# Patient Record
Sex: MALE | Race: WHITE
[De-identification: names, ages, dates, MRNs, and addresses within clinical notes are randomized per-mention and may not be internally consistent; named-entity substitution may affect disease eponyms.]

---

## 2020-01-17 NOTE — NUR
Patient discharged to home in stable conditon.  Written and verbal after care 
instructions given. 

Patient verbalizes understanding of instructions.pt walks in steady gait. pt 
says feels better.

## 2020-04-09 NOTE — NUR
Received patient awake and alert in bed, A/Ox3. No acute distress noted. No complaints of 
pain, no complaints of SOB on 2L NC saturating at 98%. Patient has history of COPD, titrated 
to 1.5L, saturating at 96-97%. Patient is Sinus tachy on the monitor. 100-110. Goes in the 
120s when coughing, noted with productive coughing. Patient is on droplet and contact 
precautions for r/o COVID. Vitals WNL. No fevers noted. Heplock on the left AC is intact and 
patent. Safety measures initiated. Bed is low and locked, call light within reach. Will 
continue to monitor.

## 2020-04-09 NOTE — NUR
Received patient from ER via [facility] with Diagnosis of COPD Exacerbation under Dr. Torres, 
Patient is awake , alert and verbally responsive. On Oxygen at 2LPM, saturating 95-96%. No 
complain of pain at this time. patient on Contact and droplet isolation for r/o covid 19. 
Proper PPE strictly Observed. Kept clean and comfortable. Will continue to monitor.

## 2020-04-09 NOTE — NUR
PT ROOM ON NEG PRESSURE

AIRBORNE/CONTACT/DROPLET ISOLATION PREC

MINIMIZED PT ROOM CHECK

 

PT MONITORED ACCORDINGLY

## 2020-04-09 NOTE — NUR
RT AT BEDSIDE FOR ABG DRAW

RT LOWERED O2 FROM 5LPM TO 2LPM



PT KEPT CALM AND COMFORTABLE.

ABLE TO TOLERATE INHALER

PT 92% O2SAT

## 2020-04-09 NOTE — NUR
Patient C/O SOB. 

A/O x3. Able to verbalize needs, able to speak complete sentenses

O2 saturation 91% RA. Placed on 5L O2 via nasal cannula with SpO2 97%. Pt 
position to comfort, high fowlers position. SRx2 for safety.

Able to walk per self without difficulty.



Monitored accordingly

## 2020-04-09 NOTE — NUR
Pt. admitted to Tele Rm 318   

Transported with isolation precations via gurney

acc by 3 staff (2ERRN, 1ERLVN)

Belongs List completed

## 2020-04-10 NOTE — NUR
Patient slept intermittently throughout the night. SR-ST on the monitor. Patient states, he 
is feeling better. Still with occasional productive coughing. Medication given as ordered. 
Patient refused morning labs, educated patient, but still refused and stated "no they poked 
me twice yesterday, they can do it tomorrow, I can't given blood today" phlebotomist stated 
he will try again after breakfast. Will endorse to next shift.

## 2020-04-10 NOTE — NUR
Received patient in bed, awake, alert and verbally responsive. On Oxygen at 2LPM, saturating 
96%. No complain of Pain or discomfort. Afebrile. Remains on droplet and contact Isolation 
for r/o covid 19. Proper PPE strictly Observed. Kept clean and comfortable. Will continue to 
monitor.

## 2020-04-10 NOTE — NUR
Patient in bed, awake, alert and verbally responsive. No signs of distress noted. No SOB. No 
desaturation noted. Afebrile. No complain of Pain or discomfort. remains on contact/droplet 
precaution for r/o covid 19, still awaiting for result. All needs attended and met. Kept 
clean and comfortable. Will endorse to Oncoming Nurse.

## 2020-04-10 NOTE — NUR
patient received lying in bed watching tv. no s/s of acute distress. v/s stable. on RA 98%. 
all needs met and medications administered, tolerated well. patient requested temazepam, 
administered, tolerated well. will continue plan of care.

## 2020-04-11 NOTE — NUR
Received patient awake and alert in bed, no signs of acute distress noted. No complaints of 
pain or SOB. Patient stated that he is feeling better. Still noted with productive coughing. 
Vitals WNL. No fevers noted. On contract/droplet isolation, waiting for COVID19 results. 
Heplock on the left AC is intact and patent. Safety measures initiated. Bed is low and 
locked, call light within reach. Will continue to monitor.

## 2020-04-11 NOTE — NUR
pt. resting comfortably in bed. Pt. denies sob/ difficulty breathing. pt. denies pain/ 
discomfort. IV intact L AC 20 gauge intact patent. pt. on room air oxygen saturation within 
normal limits. no temperature throughout shift. safety measures in place. pt. on droplet/ 
contact precautions. call light within reach. will continue to monitor pt.

## 2020-04-11 NOTE — NUR
patient slept intermittently throughout the night. no s/s of acute distress. v/s stable. 
afebrile. denies SOB or pain. will continue plan of care.

## 2020-04-11 NOTE — NUR
Received pt. resting in bed awake, alert and verbally responsive. No signs of distress 
noted. No SOB. No desaturation noted. Afebrile. Vitals stable. No complaints of Pain or 
discomfort. remains on contact/droplet precaution for r/o covid 19, still awaiting for 
result. All needs met. Kept clean and comfortable. safety measures in place. call light 
within reach. will continue to monitor pt.

## 2020-04-11 NOTE — NUR
patient awoke from sleep. provided sandwich and drinks. resting comfortably. spo2 at 90% on 
NC 1L at this time on monitor. will continue to monitor.

## 2020-04-12 NOTE — NUR
RECEIVED PATIENT AWAKE ALERT AND ORIENTED REMAIN ON RESP ISOLATION AND PRECAUTION AS ORDERED 
PENDING RESULTS OF THE COVID 19 CULTURES AS ORDERED ON ROOM AIR WITH NO SHORTNESS OF BREATH 
NOTED OCCASSIONAL COUGH EPISODES SPITTING UP SMALL AMOUNT OF WHITISH PHLEGM ABLE TO 
VERBALISE NEEDS CALL LIGHTS AND PERSONAL BELONGINGS ARE WITHIN EASY REACH AT THIS TIME WILL 
CONTINUE TO OBSERVE.

## 2020-04-12 NOTE — NUR
Received patient awake and alert in bed, no signs of acute distress noted. No complaints or 
pain or SOB, stated feeling good. Heplock on the left forearm is intact and patent. Patient 
no longer on droplet/contact isolation. Result for COVID19 is negative. Vitals WNL. Safety 
measures initiated. Bed is low and locked, call light within reach. Will continue to 
monitor.

## 2020-04-12 NOTE — NUR
RECEIVED RESULT FROM THE LAB PATIENT PATIENT IS NEGATIVE FOR COVID-19 CALLED THE SUPERVISOR 
NOTIFIED HER AND ALSO NOTIFIED ENRIQUE NP ASSISIGNED TO THIS PATIENT PATIENT REMOVED FROM 
ISOLATION AT THIS TIME OCCASSIONAL COUGH WITH SCANTY AMOUNT OF CLEAR THIN SECRETIONS REMAIN 
ON SOLU MEDROL AS ORDERED WILL CONTINUE TO OBSERVE

## 2020-04-12 NOTE — NUR
RESTING IN HIS ROOM WITH IV ATB IN PROGRESS AS ORDERED WITH NO ADVERSE OR ALLERGIC REACTIONS 
AT THIS TIME NO SOB NOTED TO BE USING O2 ON AND OFF WITH ADEQUATE SATS USES O2 AT TIMES 
STATED O2 MAKES HIM FEEL BETTER MADE COMFORTABLE WILL CONTINUE TO OBSERVE.

## 2020-04-12 NOTE — NUR
Patient slept intermittently throughout the night, no distress noted. Vitals WNL. SR/ST on 
the monitor. Medications given as ordered. All needs met. Remains on droplet/contact 
precautions. Called lab for results, still pending. Will endorse to morning shift nurse.

## 2020-04-13 NOTE — NUR
PATIENT DISCHARGED. PICKED UP BY HIS MOTHER'S CAREGIVER, LAURO. IN SATISFACTORY CONDITION, 
WITH DISCHARGE INSTRUCTION AND ALL HIS PERSONAL BELONGINGS. PATIENT INSTRUCTED TO  
HIS MEDICATION FROM HIS LOCAL PHARMACY, TAKE MEDICATION AS ORDERED AND MAKE A FOLLOW UP 
APPOINTMENT WITH HIS PHYSICIAN IN ONE WEEK, PATIENT EXPRESSED UNDERSTANDING. DENIES SOB.

## 2020-04-13 NOTE — NUR
PATIENT HAS A DISCHARGE ORDER PER THE NP STATED TO GIVE HIS LEVAQUIN THAT HE IS SUPPOSED TO 
GET AT 1800 TODAY BY 1400 AS THE PATIENT IS PLANNING TO BE PICKED UP BY 1500 PHARMACY AWARE 
TO SEND THE DOSE EARLY.

## 2020-04-13 NOTE — NUR
PATIENT RECEIVED IN BED, AWAKE, ALERT, ORIENTED, HYPER VOCAL. USES O2 ON AND OFF BUT NO 
SIGNS OF RESPIRATORY DISTRESS. STATED THAT HE COUGHS OCCASIONALLY, ALSO STATED THAT HE SLEPT 
WELL LAST NIGHT. CALL LIGHTS AND ALL HIS PERSONAL BELONGINGS ARE WITHIN EASY REACH. PLAN FOR 
THE DAY IS DOCTOR TO SEE HIM AND DISCUSS POSSIBLE DISCHARGE AND HE EXPRESSED UNDERSTANDING. 
WILL CONTINUE TO OBSERVE.
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
## 2021-03-19 NOTE — NUR
Report given to Jacqueline ARSHAD. Patient will be transferred to Room 330. All 
belongings with patient. Old record with patient.

## 2021-03-20 NOTE — NUR
Pt slept intermittently throughout the night. Admitted to MS at 2105H. Assessment complete 
and all belongings accounted for. Tolerated all medications well. Wound care done of left 
foot, pictures in the chart. Wound care consult is ordered. Bed is locked and in lowest 
position, call light is within reach. No other issues or concerns at this time. Will endorse 
to day shift.

## 2021-03-20 NOTE — NUR
pt awake alert and orientedx4, all medications given as ordered, pt on room air, no reports 
of pain, no signs of distress noted. all needs met this shift. pt has healthy appetite, 
cooperative with plan of care. Pt has BRP, ambulates to restroom, pt has walker in room at 
bedside. On regular diet, dressing changed, culture taken pending results, applied xerofoam, 
kerlix and coband. dressing is clean dry and intact. IV access on the right FA 18g IVf 
infusing  NS @75cc, patent and intact.  bed in low and locked position, call light within 
reach, all medications given as ordered, will endorse to oncoming nurse.

## 2021-03-20 NOTE — NUR
Received patient AAOx4. No s/s of acute distress noted at this time. Pt on RA denies SOB. 
Left leg dressing clean, dry, and intact. Pt c/o discomfort and Right FA IV site, will 
insert a new IV. Pt refused Lovenox. Provided patient with medication education and reviewed 
the benefits and risk. Patient continued to refused. Safety measures in place, bed locked in 
low position, and call light within reach.

## 2021-03-21 NOTE — NUR
Patient is alert and oriented. Denies sob. Due meds given and tolerated. On IV Vancomycin 
and Merrem tolerated. No adverse reaction noted. LFA IV intact and patent. Patient is 
comfortable. Bed kept low and locked at all times. Call light within reach. Needs attended. 
Will continue to monitor.

## 2021-03-21 NOTE — NUR
Received patient in bed sleeping but easily arousable. No resp distress noted. No facial 
grimacing. Left lower leg dressing intact. No bleeding noted. Bed kept low and locked. Call 
button within reach. Will continue to monitor.

## 2021-03-21 NOTE — NUR
Patient with scheduled vanco trough this morning at 0700. However, patient refused saying he 
wants it after breakfast. Called pharmacy and spoke to Christiano said to hold Vancomycin 8am 
dose for now until result. Spoke to Darinel at lab and confirmed he will remind phlebotomist 
to come back. Patient aware and agreed.

## 2021-03-22 NOTE — NUR
Received patient AAOx4. No s/s of acute distress noted at this time. VSS. Left leg dressing 
clean, dry, and intact. Pt c/o discomfort in leg 7/10 pain administered NORCO PRN and Left 
FA IV site 20G, patent and intact infusing NS 75cc/hr and IV antibiotic. Pt refused Lovenox. 
Provided patient with medication education and reviewed the benefits and risk. Patient 
verbalized understanding and refuses. Snack provided. Aware of NPO after midnight. Safety 
measures in place, bed locked in low position, and call light within reach. Will monitor 
through the night.

## 2021-03-22 NOTE — NUR
Pt is in bed resting, A&O4 on room air saturating at 98%. New IV inserted on LFA is flushing 
well and patent, received Vanco and Merrem as ordered. Pt instructed to be NPO at midnight 
for procedure tomorrow at noon. Dressing changed during shift. All Meds given as ordered. 
All needs met. Safety measures in place. Will endorse to oncoming nurse.

## 2021-03-22 NOTE — NUR
Pt is in bed resting, A&O4 on room air saturating at 99%. LFA IV is patent and flushing 
well. No acute distress noted. Pt is able to make needs known. Bed in lowest position. 
Safety measures in place. Call light in reach. Will continue to monitor.

## 2021-03-23 NOTE — NUR
RECEIVED PATIENT ALERT AND ORIENTED x 4, SLEEPING INTERMITTENTLY THIS MORNING. VSS. AWARE OF 
NPO STATUS FOR SURGICAL WOUND DEBRIDEMENT TODAY. METFORMIN HELD D/T MORNING LAB GLUCOSE 100 
AND NPO STATUS UNTIL POST-SURGERY.VERBALIZED UNDERSTANDING. IV ON LEFT FOREARM 20G - FLUSHED 
AND PATENT. SAFETY PRECAUTION IN PLACE. WILL CONTINUE TO MONITOR.

## 2021-03-23 NOTE — NUR
FOOD DELIVERY FROM [facility] RECEIVED AND DELIVERED BY SECURITY UP TO THIRD FLOOR NURSING 
STATION. INTERCEPTED D/T PATIENT'S NPO STATUS PRIOR TO SCHEDULED SURGERY.  AT 
NURSING STATION INQUIRING ABOUT PATIENT STATUS TO CONFIRM HOMELESS STATUS. INFORMED SOCIAL 
WORKER OF THAT PATIENT IS ASLEEP AND SLUMP OVER HIS BED SO IT WILL BE DIFFICULT TO INTERVIEW 
HIM AT THIS TIME, RAISING SUSPICION D/T PATIENT'S POLYSUBSTANCE ABUSE. PATIENT RECEIVED SAME 
[facility] DELIVERY YESTERDAY. SEARCHED [employer] BAG AND FOUND THREE WHITE, OVAL PILLS, AND 
A TARRY BLACK SUBSTANCE WRAPPED IN FOIL. REMOVED FROM THE DANG[employer]S BAG AND GIVEN TO 
SECURITY. [employer] CALLED BY [employer] REGARDING THE SUBSTANCES FOUND. MD NOTIFIED AND AWARE OF 
SITUATION. WILL CONTINUE TO MONITOR.

## 2021-03-23 NOTE — NUR
PICKED UP BY OR STAFF. VSS. NO S/S OF DISTRESS NOTED. JEWELRY REMOVED AND PLACED AT BEDSIDE, 
PER PATIENT REQUEST. Home

## 2021-03-23 NOTE — NUR
attempted to meet with the patient to verify living situation, but was 
informed by patient's nurse Cr that patient was lethargic at this time.  SW attempted to 
gather information from nursing regarding patient's living situation and psychosocial needs, 
and was informed that patient's living situation was unclear.  Nursing also informed this SW 
that patient has a history of polysubstance use, and that patient's friends were delivering 
Mi's to patient, both yesterday and today.  Due to patient being NPO in preparation 
for surgery today, the Mi's meal that was delivered today was being kept for the 
patient to have at a later time.  Nursing also described fluctuations in patient's level of 
consciousness and patient's mood over the last couple of days.  Per protocol, the Mi's 
bag that was delivered today was searched, and unprescribed narcotics were found folded up 
in tissue and foil and placed in-between the bread and the meat olga.   
Heri and  Adonis Sandhu were informed, along with Nursing Director Edna.   


SW will remain available to follow-up with the patient at a later time.

## 2021-03-23 NOTE — NUR
Patient refused lovenox inj. taught patient the risk and benefit of the medication but 
strongly refused.

## 2021-03-24 NOTE — NUR
WOUND DRESSING CHANGED BY DR. THOMSON AT BEDSIDE. PHOTO TAKEN OF THE WOUND AND PLACED IN 
CHART. DENIES SOB. COMPLAINTS OF PAIN POST DRESSING CHANGE. ELEVATED LEFT LEG AND ASSIST IN 
REPOSITIONING PATIENT. WILL F/U ON PAIN MEDICATION AVAILABLE. WILL CONTINUE TO MONITOR.

## 2021-03-24 NOTE — NUR
PATIENT ALERT ORIENTED, NO SOB NO CHEST PAIN. PATIENT CONT PAIN MANAGEMENT, PATIENT LEFT LEG 
DRESSING INTACT, NO BLEEDING NOTED, CONT TO MONITOR.

## 2021-03-25 NOTE — NUR
Received patient in bed AAOx3-4. No s/s of acute distress noted at this time. Pt on RA 
denies SOB at this time. Left lower leg dressing clean, dry, and intact. Pt c/o episode of 
diarrhea, Notified Jaqui COULTER, new order for one time imodium, will administer per order. 
BALDEV midline patent and intact. Safety measures in place, call light in reach, bed locked and 
in low position.

## 2021-03-25 NOTE — NUR
received change of shift report. pt in bed resting, no complaints of pain at this time, on 
room air, no signs of shortness of breath. Left lower leg wound covered with dressing, 
clean, dry and intact. A/O x4, amb, steady gait, pt has BRP, pt has urinal at bedside, IV 
access on the right UA midline 20g running NS at 75cc. bed in low and locked position, call 
light within reach, safety precautions in place.

## 2021-03-25 NOTE — NUR
pt discharge home with all belongings, paperwork and prescriptions, ID band removed, IV line 
removed at 1330. Pt was waiting for ride that could not come, and had to reschedule another 
ride. pt was picked up at 1540. pt left via wheelchair to taxi. ambulatory with walker, all 
medications given as ordered, no reports of pain, dressing changed, dry, clean and intact, 
no signs of distress noted.

## 2021-06-22 NOTE — NUR
Patient is resting comfortably on gurney with eyes closed.  Pt is admitted to   
[facility]-surgical room 322, under care of Dr. De Leon.  Belongings List 
completed.  MRSA swab collected.

## 2021-06-22 NOTE — NUR
Received patient in bed alert x4.On RA, denies SOB. no c/o pain at this time.Cellulitis on 
LT lower leg, dressing changed .Tolerated well. Iv on right hand patent and intact.VSS.Call 
light with in reach.will continue to monitor.

## 2021-06-22 NOTE — NUR
received patient from ER in stable condition, patient admitted in stable, no complains of 
any pain or discomfort at this time. call light within reach. will report to oncoming shift.

## 2021-06-23 NOTE — NUR
WOUND CARE CONSULT: PT PRESENTS WITH LARGE PURULENT WOUND TO LEFT FOOT/LOWER LEG AND SACRAL 
STAGE 2 ULCER, PRESENT ON ADMISSION. PT STATES HAS HAD SACRAL ULCER FOR 4 DAYS. 
RECOMMENDATIONS MADE FOR WOUND CARE AND SKIN PROTECTION. DISCUSSED WITH NURSING STAFF. DEFER 
TO DPM (DR THOMSON ) FOR LEFT LOWER LEG WOUND. MD IN AGREEMENT WITH PLAN OF CARE. 

-------------------------------------------------------------------------------

Addendum: 06/23/21 at 1205 by RAYMUNDO MOFFETT RN

-------------------------------------------------------------------------------

Amended: Links added.

## 2021-06-23 NOTE — NUR
Resting but easily arousable. No acute distress. Denies pain. Debridement done by Dr. Roblero tolerated. BS rechecked noted 219. Voiding well. Needs attended.

## 2021-06-23 NOTE — NUR
Seen and examined by Lina for wound care. Patient states he's had sacral pressure ulcer st 2 
for 4 days. Wound care done as ordered. Patient denies pain.

## 2021-06-23 NOTE — NUR
Resting but easily arousable. Breathing non labored on room air comfortable. Left leg 
dressing intact. Able to ambulate to the bathroom by himself. Denies pain at this time. Bed 
is low and locked. Call light in place. Continue to monitor.

## 2021-06-23 NOTE — NUR
RECEIVED PT AWAKE, ALERT AND ORIENTEDX4. PT IN NO ACUTE DISTRESS. IV INTACT. SAFETY AND 
COMFORT PROVIDED. WILL CONTINUE TO MONITOR.

## 2021-06-24 NOTE — NUR
Patient is discharged and was picked up by Jose Luis, his friend, with all his personal 
belongings. He was instructed on picking up his new medications from the pharmacy and to 
follow up with his primary care within a week. All information was given to him and he 
expressed understanding. Discharged in satisfactory condition.

## 2021-06-24 NOTE — NUR
PT SLEPT INTERMITTENTLY. PT IN NO ACUTE DISTRESS. IV INTACT.  PRESCRIBED MEDICATION GIVEN 
AND PT TOLERATED IT WELL.PT  WOKEUP AT 0215H AND ASKED FOR HIS MORPHINE MEDICATION, TOLD HIM 
HE DOESN'T HAVE ANY MORPHINE BUT HAS NORCO. HE STATED HE TOOK IT BEFORE AND MAKE HIM SLEEP. 
REVIEWED HIS CHART AND TOLD HIM HE DIDN'T GET ANY MORPHINE SINCE HE WAS ADMITTED IN ER.  
TOLD HIM ONLY NORCO WAS ORDERED FOR HIM. AT 0232H NORCO 5-325MG PRN GIVEN TO PT FOR LEFT LEG 
AND FOOT PAIN. PT REFUSED TO HAVE HIS BLOOD SUGAR CHECKED IN AM AND LAST NIGHT. HE STATED 
THE PHLEBOTOMIST HAVE HIS BLOOD SO WE JUST NEED TO CHECK HIS RESULT FROM THERE.  SAFETY AND 
COMFORT PROVIDED. ALL NEEDS ARE MET.WILL ENDORSE TO INCOMING NURSE FOR CONTINUITY OF CARE.

## 2021-06-24 NOTE — NUR
Patient states he is experiencing upset stomach. Had a soft brown BM this morning. Contacted 
Dr. De Leon with new orders for 15 mL Mylanta once PRN upset stomach. Will administer as 
ordered and continue to monitor.

## 2021-06-24 NOTE — NUR
Patient aware of pending discharge after lunch once culture results come back. Patient 
states his friend, Jose Luis will be taking him home. Patient states his girlfriend will be able 
to help him at home.

## 2021-06-24 NOTE — NUR
Patient received in bed with eyes closed, but easily arousable. Patient states he has no 
pain or discomforts at this time and is ready to go home. If discharged today, patient 
states his friend Jose Luis is able available for transport. Patient refused to have his blood 
sugar checked with the previous shift, but blood draw shows glucose level of 118 and no 
insulin was given as per protocol. IV on his right hand 20G is patent with no redness or 
swelling. Patient on RA with no SOB or difficulties breathing. Call light and personal 
belongings within easy reach. Will continue to monitor.

## 2021-06-24 NOTE — NUR
Spoke to lab and as per lab, the pending culture will not be ready today. It will take a 
minimum of 48 hours to process. Updated Dr. De Leon and asked for discharge clarification 
orders. As per Dr. De Leon, okay to discharge patient without the culture results. 
Informed patient and patient expressed understanding. Stated that his friend ("brother in 
law"), Jose Luis, could be here at 1900 tonight. Patient also refused blood sugar check stating 
that it doesn't matter since he'll be going home. Patient educated on importance and 
benefits of blood sugar checks, but continues to refuse. Will continue to monitor and 
proceed with discharge.

## 2022-02-03 NOTE — NUR
RECEIVED PATIENT VIA GURNEY FROM ER. PATIENT IS A/O X4. OPEN WOUND NOTED TO LEFT LOWER LEG. 
NO C/O PAIN AT THIS TIME. NO RESP. DISTRESS NOTED. H/L INTACT AND PATENT, NOTED TO LEFT FA 
#20 GAUGE. ORIENTED PATIENT TO ROOM AND CALL LIGHT. CALL LIGHT IN REACH. ALL NEEDS ATTENDED. 
WILL CONTINUE TO MONITOR AND ASSESS.

## 2022-02-04 NOTE — NUR
WOUND CARE CONSULT: PT PRESENTS WITH PURULENT WOUND TO LEFT ANTERIOR FOOT, PRESENT ON 
ADMISSION. DR THOMSON NOTIFIED OF DPM CONSULT REQUEST. PT IS VERY THIN AND BONY BUT IS ABLE 
TO ASSIST WITH TURNING AND REPOSITIONING IN BED. MD IN AGREEMENT WITH PLAN OF CARE.

## 2022-02-04 NOTE — NUR
Patient yelling at staff, restless, refusing any blood draw or blood sugar check. Explained 
risks and benefits of blood sugar check and insulin coverage. Patient still refuses.

## 2022-02-04 NOTE — NUR
Spoke to Pharmacy and confirmed that we may administer Vanco as ordered even trough Van 
Trough was not drawn.

## 2022-02-04 NOTE — NUR
Arrived to patient's room awake, alert, and oriented.  Patient able to make needs known.  IV 
site intact and patent.  Call light within reach with bed left in lowest position.  Patient 
to receive antibiotics at 1200.  Will monitor patient throughout shift.

## 2022-02-04 NOTE — NUR
Patient is sleeping, AAO to person and place. Patient is restless, yelling at staff to turn 
off the light and complaining of being cold. Covered with warm blanket and patient 
satisfied. On RA, no SOB noted. Left FA IV in place, patent and intact. Safety measures 
initiated, call light within reach.

## 2022-02-04 NOTE — NUR
Patient in bed distressed due to withdrawal from heroin use.  Patient received norco per Dr. Goddard orders.  Patient in bed resting.  Wound consult on patient's LLL due to cellulitis.  
New dressing placed this afternoon by Dr. Arzate.  Bed left in lowest position with call 
light within reach.  Will endorse information to PM nurse.

## 2022-02-05 NOTE — NUR
Patient left against medical advice. Patient AOx4. On room air. Restless and yelling at 
staff and stating wanting to go home. Patient refused morning dose of Humulin R. Informed 
and explained to patient risks of leaving AMA and patient verbalized understanding. Patient 
signed AMA form and placed on chart. IV access removed. ID armband removed. Patient 
belongings accounted for and belongings list signed. Dr. oGddard informed of AMA. Patient 
stated he would be picked up by friend to take him home.

## 2022-02-05 NOTE — NUR
Patient noted with multiple episodes of restlessness, speaking to himself. Also, hostile 
with staff and uncooperative. Patient VS are stable. Able to draw blood this am. On Ra, NO 
sob. Dressing in place, clean and intact to left foot. Call light within reach.

## 2022-07-07 ENCOUNTER — HOSPITAL ENCOUNTER (INPATIENT)
Dept: HOSPITAL 12 - ER | Age: 57
LOS: 2 days | Discharge: LEFT BEFORE DISCHARGE | DRG: 383 | End: 2022-07-09
Payer: COMMERCIAL

## 2022-07-07 VITALS — SYSTOLIC BLOOD PRESSURE: 153 MMHG | DIASTOLIC BLOOD PRESSURE: 100 MMHG

## 2022-07-07 VITALS — HEIGHT: 70 IN | BODY MASS INDEX: 17.9 KG/M2 | WEIGHT: 125 LBS

## 2022-07-07 DIAGNOSIS — J44.9: ICD-10-CM

## 2022-07-07 DIAGNOSIS — E11.40: ICD-10-CM

## 2022-07-07 DIAGNOSIS — F17.210: ICD-10-CM

## 2022-07-07 DIAGNOSIS — L03.116: Primary | ICD-10-CM

## 2022-07-07 DIAGNOSIS — E11.621: ICD-10-CM

## 2022-07-07 DIAGNOSIS — E87.6: ICD-10-CM

## 2022-07-07 DIAGNOSIS — M79.662: ICD-10-CM

## 2022-07-07 DIAGNOSIS — L97.828: ICD-10-CM

## 2022-07-07 DIAGNOSIS — F10.10: ICD-10-CM

## 2022-07-07 DIAGNOSIS — F19.10: ICD-10-CM

## 2022-07-07 LAB
ALP SERPL-CCNC: 103 U/L (ref 50–136)
ALT SERPL W/O P-5'-P-CCNC: 26 U/L (ref 16–63)
AST SERPL-CCNC: 13 U/L (ref 15–37)
BILIRUB DIRECT SERPL-MCNC: 0.1 MG/DL (ref 0–0.2)
BILIRUB SERPL-MCNC: 0.1 MG/DL (ref 0.2–1)
BUN SERPL-MCNC: 14 MG/DL (ref 7–18)
CHLORIDE SERPL-SCNC: 104 MMOL/L (ref 98–107)
CO2 SERPL-SCNC: 29 MMOL/L (ref 21–32)
CREAT SERPL-MCNC: 0.9 MG/DL (ref 0.6–1.3)
GLUCOSE SERPL-MCNC: 132 MG/DL (ref 74–106)
HCT VFR BLD AUTO: 41.3 % (ref 36.7–47.1)
MCH RBC QN AUTO: 28.2 UUG (ref 23.8–33.4)
MCV RBC AUTO: 85.5 FL (ref 73–96.2)
PLATELET # BLD AUTO: 302 K/UL (ref 152–348)
POTASSIUM SERPL-SCNC: 3.4 MMOL/L (ref 3.5–5.1)
WS STN SPEC: 7.5 G/DL (ref 6.4–8.2)

## 2022-07-07 PROCEDURE — A6209 FOAM DRSG <=16 SQ IN W/O BDR: HCPCS

## 2022-07-07 PROCEDURE — A4663 DIALYSIS BLOOD PRESSURE CUFF: HCPCS

## 2022-07-07 PROCEDURE — G0378 HOSPITAL OBSERVATION PER HR: HCPCS

## 2022-07-07 RX ADMIN — DEXTROSE SCH MLS/HR: 50 INJECTION, SOLUTION INTRAVENOUS at 21:27

## 2022-07-07 NOTE — NUR
pt transported to room 315 via [facility] with all belongings, PAVITHRA Zepeda at 
bedside to receive the pt.

## 2022-07-08 VITALS — SYSTOLIC BLOOD PRESSURE: 156 MMHG | DIASTOLIC BLOOD PRESSURE: 106 MMHG

## 2022-07-08 VITALS — SYSTOLIC BLOOD PRESSURE: 143 MMHG | DIASTOLIC BLOOD PRESSURE: 102 MMHG

## 2022-07-08 LAB
ALP SERPL-CCNC: 89 U/L (ref 50–136)
ALT SERPL W/O P-5'-P-CCNC: 25 U/L (ref 16–63)
AST SERPL-CCNC: 14 U/L (ref 15–37)
BILIRUB SERPL-MCNC: 0.2 MG/DL (ref 0.2–1)
BUN SERPL-MCNC: 9 MG/DL (ref 7–18)
CHLORIDE SERPL-SCNC: 106 MMOL/L (ref 98–107)
CO2 SERPL-SCNC: 29 MMOL/L (ref 21–32)
CREAT SERPL-MCNC: 0.6 MG/DL (ref 0.6–1.3)
GLUCOSE SERPL-MCNC: 97 MG/DL (ref 74–106)
HCT VFR BLD AUTO: 38.2 % (ref 36.7–47.1)
MAGNESIUM SERPL-MCNC: 1.8 MG/DL (ref 1.8–2.4)
MCH RBC QN AUTO: 28.2 UUG (ref 23.8–33.4)
MCV RBC AUTO: 83.9 FL (ref 73–96.2)
PHOSPHATE SERPL-MCNC: 3 MG/DL (ref 2.5–4.9)
PLATELET # BLD AUTO: 283 K/UL (ref 152–348)
POTASSIUM SERPL-SCNC: 3.8 MMOL/L (ref 3.5–5.1)
WS STN SPEC: 7.1 G/DL (ref 6.4–8.2)

## 2022-07-08 PROCEDURE — 0JBR0ZZ EXCISION OF LEFT FOOT SUBCUTANEOUS TISSUE AND FASCIA, OPEN APPROACH: ICD-10-PCS

## 2022-07-08 RX ADMIN — DEXTROSE SCH MLS/HR: 50 INJECTION, SOLUTION INTRAVENOUS at 21:40

## 2022-07-08 RX ADMIN — DEXTROSE SCH MLS/HR: 50 INJECTION, SOLUTION INTRAVENOUS at 09:58

## 2022-07-08 RX ADMIN — HYDROCODONE BITARTRATE AND ACETAMINOPHEN PRN TAB: 5; 325 TABLET ORAL at 09:57

## 2022-07-08 RX ADMIN — DEXTROSE SCH MLS/HR: 50 INJECTION, SOLUTION INTRAVENOUS at 22:17

## 2022-07-08 RX ADMIN — HYDROCODONE BITARTRATE AND ACETAMINOPHEN PRN TAB: 5; 325 TABLET ORAL at 22:09

## 2022-07-08 RX ADMIN — Medication SCH ML: at 17:17

## 2022-07-08 NOTE — NUR
WOUND CARE CONSULT: PT PRESENTS WITH OPEN WOUND TO LEFT FOOT/ANKLE AREA WHICH IS DRY, 
PRESENT ON ADMISSION. DPM CONSULT CALLED TO DR CHRISTIAN BERNARD IN AGREEMENT WITH PLAN OF CARE.

## 2022-07-08 NOTE — NUR
received a 56 year male from ER with admitting diagnosis of left foot 

cellulitis. AAOx4 Ambulatory as desired. Hx of asthma, bronchitis, HTN

and cellulitis of the left foot. Left foot clean and pinkish in color, no 

drainage and NITIN. VSS. All needs attended. Voiding well. Has a right

arm heplock # 20 flushed and patent. On IV ABT as ordered, tolerated

well. No ill effects noted. Denies any pain at this time. Will monitor patient. 

Left foo5t reddenned and warm to touch. Left foot also swollen.

## 2022-07-08 NOTE — NUR
Social Work consult requested for patient on [facility] for subsidized food and nutrition 
(SNAP) resources. Patient is a 56-year-old male admitted to the hospital for cellulitis. 
Upon  assessment, patient was alert and oriented X4. Patient presents with 
euthymic mood and full range of affect. Patient presents with coherent, and goal directed 
thought process. SW explored patients social support. Patient states that his primary 
 is his sister, Jayla Ramsey ([phone]) and they have a good 
relationship. SW explored patients living situation. Patient states that he lives in a 
house with his sister, Jayla ([phone]) and mother at [address] [place] [address]. SW 
explored patients financial status. Patient states that he is unemployed and requested 
subsidized food and nutrition (SNAP) resources and disability resources. SW gave the patient 
subsidized food and nutrition (SNAP) resources and disability resources and placed them in 
the chart. Patient states he is independent with is ADLs and has a walking stick and walker 
at home. SW explored patients history of substance abuse. Patient states that he has been 
sober from alcohol and opioids for 10 days. There is no current toxicology report and the 
toxicology report from 3/20/21 shows positive for opioids. SW offered the patient substance 
abuse resources and put the resources in the chart. SW explored history of psychiatric 
diagnosis. Patient denies history of psychiatric diagnosis. Patient denied suicidal or 
homicidal ideation. SW provided emotional support, validation, and coping strategies.



Patient states that his sister, Jayla ([phone]) will pick him up at discharge and he 
will go home at discharge.

## 2022-07-09 VITALS — SYSTOLIC BLOOD PRESSURE: 151 MMHG | DIASTOLIC BLOOD PRESSURE: 92 MMHG

## 2022-07-09 RX ADMIN — Medication SCH ML: at 08:44

## 2022-07-09 RX ADMIN — HYDROCODONE BITARTRATE AND ACETAMINOPHEN PRN TAB: 5; 325 TABLET ORAL at 08:48

## 2022-07-09 NOTE — NUR
LEFT AMA STATES HE HAS A RIDE TO PICK HIM UP TODAY. STATES HE PLANS TO KEEP UP WITH HIS PMD 
AND HIS DRSG CHANGES WITH OUTSIDE NRSG CARE

## 2022-07-09 NOTE — NUR
Slept throughout the night. No distress noted. Denies pain at this time. Able to make needs 
known. IV site intact. Will endorse to day shift.

## 2023-07-13 ENCOUNTER — HOSPITAL ENCOUNTER (EMERGENCY)
Dept: HOSPITAL 12 - ER | Age: 58
Discharge: HOME | End: 2023-07-13
Payer: COMMERCIAL

## 2023-07-13 VITALS — HEIGHT: 70 IN | BODY MASS INDEX: 18.61 KG/M2 | WEIGHT: 130 LBS

## 2023-07-13 VITALS — OXYGEN SATURATION: 97 % | SYSTOLIC BLOOD PRESSURE: 101 MMHG | DIASTOLIC BLOOD PRESSURE: 76 MMHG

## 2023-07-13 DIAGNOSIS — Z79.1: ICD-10-CM

## 2023-07-13 DIAGNOSIS — Z79.2: ICD-10-CM

## 2023-07-13 DIAGNOSIS — J44.9: ICD-10-CM

## 2023-07-13 DIAGNOSIS — S01.81XD: Primary | ICD-10-CM
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
## 2023-07-13 DIAGNOSIS — Z88.2: ICD-10-CM

## 2023-07-13 DIAGNOSIS — Z79.899: ICD-10-CM

## 2023-07-13 DIAGNOSIS — E11.9: ICD-10-CM

## 2023-07-13 DIAGNOSIS — Z88.8: ICD-10-CM

## 2023-07-13 PROCEDURE — A4663 DIALYSIS BLOOD PRESSURE CUFF: HCPCS
